# Patient Record
Sex: FEMALE | Race: WHITE | NOT HISPANIC OR LATINO | Employment: UNEMPLOYED | URBAN - METROPOLITAN AREA
[De-identification: names, ages, dates, MRNs, and addresses within clinical notes are randomized per-mention and may not be internally consistent; named-entity substitution may affect disease eponyms.]

---

## 2019-03-14 ENCOUNTER — TELEPHONE (OUTPATIENT)
Dept: PHARMACY | Facility: CLINIC | Age: 74
End: 2019-03-14

## 2019-03-14 NOTE — TELEPHONE ENCOUNTER
Documentation Only:    Faxed Prior Authorization form and supporting documentation for Prolia to insurance on 03/14/2019.

## 2019-03-14 NOTE — TELEPHONE ENCOUNTER
Notified the patient we received the prescription for PROLIA, and we will need to complete a benefits investigation with the insurance company. Patient voiced understanding. -dol

## 2019-03-18 NOTE — TELEPHONE ENCOUNTER
Documentation Only:    Prior Authorization for Prolia has been approved for Two years.    Approval dates:  03/15/2019 through 03/10/2021    Patient co-pay: $256.00    Researching possible co-pay assistance.

## 2019-03-20 NOTE — TELEPHONE ENCOUNTER
[facility] received prescription for Prolia. It is possible that this medication may be covered under the patients Medical Benefit. Sending staff message to Dr Jaydon Sarabia in regards to buy and bill for patient's Prolia.     If for any reason the medication is not covered under the Medical Benefit; a prior authorization has been approved for 3/15/19 through 3/10/21 and can be filled at [facility].

## 2020-01-08 PROBLEM — M19.90 ARTHRITIS: Status: ACTIVE | Noted: 2020-01-08

## 2020-01-09 PROBLEM — R60.0 LOCALIZED EDEMA: Status: ACTIVE | Noted: 2020-01-09

## 2020-01-09 PROBLEM — D50.0 ANEMIA DUE TO CHRONIC BLOOD LOSS: Status: ACTIVE | Noted: 2020-01-09

## 2020-01-23 PROBLEM — R29.898 WEAKNESS OF BOTH LOWER EXTREMITIES: Status: ACTIVE | Noted: 2020-01-23

## 2020-01-23 PROBLEM — E86.0 DEHYDRATION: Status: ACTIVE | Noted: 2020-01-23

## 2020-01-23 PROBLEM — R79.0 LOW SERUM PHOSPHORUS FOR AGE: Status: ACTIVE | Noted: 2020-01-23

## 2020-01-27 PROBLEM — N17.9 AKI (ACUTE KIDNEY INJURY): Status: ACTIVE | Noted: 2020-01-27

## 2022-05-02 PROBLEM — K21.9 ACID REFLUX: Status: ACTIVE | Noted: 2022-05-02

## 2022-05-02 PROBLEM — F32.A DEPRESSION: Status: ACTIVE | Noted: 2022-05-02

## 2022-05-02 PROBLEM — F41.9 ANXIETY: Status: ACTIVE | Noted: 2022-05-02

## 2022-05-02 PROBLEM — I10 HTN (HYPERTENSION): Status: ACTIVE | Noted: 2022-05-02

## 2022-05-02 PROBLEM — J44.9 CHRONIC OBSTRUCTIVE PULMONARY DISEASE: Status: ACTIVE | Noted: 2022-05-02

## 2022-05-02 PROBLEM — F41.1 GENERALIZED ANXIETY DISORDER: Status: ACTIVE | Noted: 2022-05-02

## 2022-05-02 PROBLEM — G89.29 CHRONIC PAIN: Status: ACTIVE | Noted: 2022-05-02

## 2022-05-02 PROBLEM — E78.5 HYPERLIPEMIA: Status: ACTIVE | Noted: 2022-05-02
